# Patient Record
Sex: FEMALE | Race: BLACK OR AFRICAN AMERICAN | NOT HISPANIC OR LATINO | Employment: UNEMPLOYED | URBAN - METROPOLITAN AREA
[De-identification: names, ages, dates, MRNs, and addresses within clinical notes are randomized per-mention and may not be internally consistent; named-entity substitution may affect disease eponyms.]

---

## 2018-07-30 ENCOUNTER — HOSPITAL ENCOUNTER (EMERGENCY)
Facility: HOSPITAL | Age: 38
Discharge: HOME | End: 2018-07-30
Attending: EMERGENCY MEDICINE | Admitting: EMERGENCY MEDICINE
Payer: COMMERCIAL

## 2018-07-30 VITALS
DIASTOLIC BLOOD PRESSURE: 83 MMHG | BODY MASS INDEX: 25.23 KG/M2 | HEART RATE: 77 BPM | TEMPERATURE: 98.5 F | OXYGEN SATURATION: 100 % | WEIGHT: 157 LBS | RESPIRATION RATE: 18 BRPM | HEIGHT: 66 IN | SYSTOLIC BLOOD PRESSURE: 139 MMHG

## 2018-07-30 DIAGNOSIS — S01.01XA LACERATION OF SCALP, INITIAL ENCOUNTER: Primary | ICD-10-CM

## 2018-07-30 PROCEDURE — 99282 EMERGENCY DEPT VISIT SF MDM: CPT

## 2018-07-30 RX ORDER — IBUPROFEN 600 MG/1
600 TABLET ORAL ONCE
Status: COMPLETED | OUTPATIENT
Start: 2018-07-30 | End: 2018-07-30

## 2018-07-30 RX ADMIN — IBUPROFEN 600 MG: 600 TABLET ORAL at 04:51

## 2018-07-30 NOTE — DISCHARGE INSTRUCTIONS
Laceration   WHAT YOU NEED TO KNOW:   A laceration is an injury to the skin and the soft tissue underneath it  Lacerations happen when you are cut or hit by something  They can happen anywhere on the body  DISCHARGE INSTRUCTIONS:   Return to the emergency department if:   · You have heavy bleeding or bleeding that does not stop after 10 minutes of holding firm, direct pressure over the wound  · Your wound opens up  Contact your healthcare provider if:   · You have a fever or chills  · Your laceration is red, warm, or swollen  · You have red streaks on your skin coming from your wound  · You have white or yellow drainage from the wound that smells bad  · You have pain that gets worse, even after treatment  · You have questions or concerns about your condition or care  Medicines:   · Prescription pain medicine  may be given  Ask how to take this medicine safely  · Antibiotics  help treat or prevent a bacterial infection  · Take your medicine as directed  Contact your healthcare provider if you think your medicine is not helping or if you have side effects  Tell him or her if you are allergic to any medicine  Keep a list of the medicines, vitamins, and herbs you take  Include the amounts, and when and why you take them  Bring the list or the pill bottles to follow-up visits  Carry your medicine list with you in case of an emergency  Care for your wound as directed:   · Do not get your wound wet  until your healthcare provider says it is okay  Do not soak your wound in water  Do not go swimming until your healthcare provider says it is okay  Carefully wash the wound with soap and water  Gently pat the area dry or allow it to air dry  · Change your bandages  when they get wet, dirty, or after washing  Apply new, clean bandages as directed  Do not apply elastic bandages or tape too tight  Do not put powders or lotions over your incision  · Apply antibiotic ointment as directed  Your healthcare provider may give you antibiotic ointment to put over your wound if you have stitches  If you have strips of tape over your incision, let them dry up and fall off on their own  If they do not fall off within 14 days, gently remove them  If you have glue over your wound, do not remove or pick at it  If your glue comes off, do not replace it with glue that you have at home  · Check your wound every day for signs of infection such as swelling, redness, or pus  Self-care:   · Apply ice  on your wound for 15 to 20 minutes every hour or as directed  Use an ice pack, or put crushed ice in a plastic bag  Cover it with a towel  Ice helps prevent tissue damage and decreases swelling and pain  · Use a splint as directed  A splint will decrease movement and stress on your wound  It may help it heal faster  A splint may be used for lacerations over joints or areas of your body that bend  Ask your healthcare provider how to apply and remove a splint  · Decrease scarring of your wound  by applying ointments as directed  Do not apply ointments until your healthcare provider says it is okay  You may need to wait until your wound is healed  Ask which ointment to buy and how often to use it  After your wound is healed, use sunscreen over the area when you are out in the sun  You should do this for at least 6 months to 1 year after your injury  Follow up with your healthcare provider as directed: You may need to follow up in 24 to 48 hours to have your wound checked for infection  You will need to return in 3 to 14 days if you have stitches or staples so they can be removed  Care for your wound as directed to prevent infection and help it heal  Write down your questions so you remember to ask them during your visits  © 2017 [employer] Information is for End User's use only and may not be sold, redistributed or otherwise used for commercial purposes   All illustrations and images included in [facility] are the copyrighted property of A D A M , Inc  or Steffen Ospina  The above information is an  only  It is not intended as medical advice for individual conditions or treatments  Talk to your doctor, nurse or pharmacist before following any medical regimen to see if it is safe and effective for you

## 2018-07-30 NOTE — ED PROVIDER NOTES
History  Chief Complaint   Patient presents with    Head Laceration     Pt  states a headboard fell and it her in the back of her head this morning  denies LOC     61-year-old female presents today complaining of head injury which occurred just prior to arrival   States she was taking her bed apart  and the headboard fell, hitting her on the head  There is no loss of consciousness  She did sustain a laceration  She is not on any blood thinners  Tetanus is up-to-date  History provided by:  Patient  Head Injury w/unknown LOC   Location:  L parietal  Mechanism of injury: direct blow    Pain details:     Quality:  Aching    Severity:  Mild    Timing:  Constant    Progression:  Unchanged  Chronicity:  New  Relieved by:  None tried  Worsened by:  Nothing  Ineffective treatments:  None tried  Associated symptoms: no blurred vision, no disorientation, no double vision, no headaches, no loss of consciousness, no nausea, no neck pain and no vomiting    Risk factors: no alcohol use and no aspirin use        None       History reviewed  No pertinent past medical history  History reviewed  No pertinent surgical history  History reviewed  No pertinent family history  I have reviewed and agree with the history as documented  Social History   Substance Use Topics    Smoking status: Never Smoker    Smokeless tobacco: Never Used    Alcohol use Not on file        Review of Systems   Constitutional: Negative for chills, diaphoresis and fever  Eyes: Negative for blurred vision, double vision and visual disturbance  Gastrointestinal: Negative for nausea and vomiting  Musculoskeletal: Negative for neck pain  Skin: Positive for wound  Negative for pallor and rash  Allergic/Immunologic: Negative for immunocompromised state  Neurological: Negative for loss of consciousness and headaches  Psychiatric/Behavioral: Negative for confusion         Physical Exam  Physical Exam   Constitutional: She is oriented to person, place, and time  She appears well-developed and well-nourished  HENT:   Head:       Right Ear: External ear normal    Left Ear: External ear normal    Mouth/Throat: Oropharynx is clear and moist    Small 1 cm laceration on the left parietal scalp  Bleeding is controlled   Eyes: EOM are normal  Pupils are equal, round, and reactive to light  Neck: Normal range of motion  Neck supple  Neurological: She is alert and oriented to person, place, and time  Patient moving all extremities equally, no focal neuro deficits noted  Skin: Skin is warm and dry  Capillary refill takes less than 2 seconds  Psychiatric: She has a normal mood and affect  Nursing note and vitals reviewed  Vital Signs  ED Triage Vitals [07/30/18 0329]   Temperature Pulse Respirations Blood Pressure SpO2   98 5 °F (36 9 °C) 77 18 139/83 100 %      Temp Source Heart Rate Source Patient Position - Orthostatic VS BP Location FiO2 (%)   Oral Monitor Sitting Right arm --      Pain Score       Worst Possible Pain           Vitals:    07/30/18 0329   BP: 139/83   Pulse: 77   Patient Position - Orthostatic VS: Sitting       Visual Acuity  Visual Acuity      Most Recent Value   L Pupil Size (mm)  3   R Pupil Size (mm)  3          ED Medications  Medications   ibuprofen (MOTRIN) tablet 600 mg (600 mg Oral Given 7/30/18 0451)       Diagnostic Studies  Results Reviewed     None                 No orders to display              Procedures  Lac Repair  Date/Time: 7/30/2018 4:20 AM  Performed by: Kelly Mejia  Authorized by: Kelly Mejia   Consent: Verbal consent obtained    Risks and benefits: risks, benefits and alternatives were discussed  Patient understanding: patient states understanding of the procedure being performed  Patient identity confirmed: verbally with patient  Body area: head/neck  Location details: scalp  Laceration length: 1 cm  Foreign bodies: no foreign bodies  Tendon involvement: none  Nerve involvement: none  Vascular damage: no    Sedation:  Patient sedated: no    Wound Dehiscence:  Superficial Wound Dehiscence: simple closure      Procedure Details:  Irrigation solution: saline  Irrigation method: syringe  Amount of cleaning: standard  Skin closure: staples  Number of sutures: 1  Approximation: close  Approximation difficulty: simple  Patient tolerance: Patient tolerated the procedure well with no immediate complications             Phone Contacts  ED Phone Contact    ED Course                               MDM  Number of Diagnoses or Management Options  Laceration of scalp, initial encounter: minor  Risk of Complications, Morbidity, and/or Mortality  Presenting problems: low  Diagnostic procedures: low  Management options: low    Patient Progress  Patient progress: stable    CritCare Time    Disposition  Final diagnoses:   Laceration of scalp, initial encounter     Time reflects when diagnosis was documented in both MDM as applicable and the Disposition within this note     Time User Action Codes Description Comment    7/30/2018  4:19 AM Romel Odom Add [S01 01XA] Laceration of scalp, initial encounter       ED Disposition     ED Disposition Condition Comment    Discharge  Bhavani Boutte discharge to home/self care  Condition at discharge: Stable        Follow-up Information     Follow up With Specialties Details Why Contact Info Additional Information    [address] [facility] Emergency Department Emergency Medicine In 1 week For suture removal [address] [place] [address] ED, [address] [place], [place], [facility]          There are no discharge medications for this patient  No discharge procedures on file      ED Provider  Electronically Signed by           Mercedes Tovar DO  07/30/18 5563

## 2018-08-10 ENCOUNTER — HOSPITAL ENCOUNTER (EMERGENCY)
Facility: HOSPITAL | Age: 38
Discharge: HOME | End: 2018-08-10
Attending: EMERGENCY MEDICINE | Admitting: EMERGENCY MEDICINE
Payer: COMMERCIAL

## 2018-08-10 VITALS
OXYGEN SATURATION: 100 % | WEIGHT: 160 LBS | HEART RATE: 75 BPM | HEIGHT: 65 IN | BODY MASS INDEX: 26.66 KG/M2 | SYSTOLIC BLOOD PRESSURE: 127 MMHG | RESPIRATION RATE: 17 BRPM | TEMPERATURE: 98.5 F | DIASTOLIC BLOOD PRESSURE: 72 MMHG

## 2018-08-10 DIAGNOSIS — Z48.02 ENCOUNTER FOR STAPLE REMOVAL: Primary | ICD-10-CM

## 2018-08-10 PROCEDURE — 99281 EMR DPT VST MAYX REQ PHY/QHP: CPT

## 2018-08-11 NOTE — ED PROVIDER NOTES
History  Chief Complaint   Patient presents with    Suture / Staple Removal     Pt  reports needing one staple removed from left lateral aspect of head  56-year-old female presents for staple removal from left scalp  She had a headboard fall on her 10 days ago, had a staple placed here  She has had no symptoms of head injury  No bleeding from the site  She presents with a wound that is clean, dry, intact - ready to have staple removed  None       History reviewed  No pertinent past medical history  History reviewed  No pertinent surgical history  History reviewed  No pertinent family history  I have reviewed and agree with the history as documented  Social History   Substance Use Topics    Smoking status: Never Smoker    Smokeless tobacco: Never Used    Alcohol use Not on file        Review of Systems   Constitutional: Negative for chills and fever  Skin: Positive for wound (scalp - healed)  Negative for rash  Neurological: Negative for headaches  Physical Exam  Physical Exam   Constitutional: She is oriented to person, place, and time  She appears well-developed and well-nourished  No distress  HENT:   Head: Normocephalic  Mouth/Throat: Oropharynx is clear and moist    2 centimeter scab to left parietal scalp with a single staple in it  Stable is removed without incident  Mild hematoma under the skin  Mild tenderness to palpation  Eyes: Conjunctivae and EOM are normal    Neck: Normal range of motion  Pulmonary/Chest: Effort normal  No respiratory distress  Musculoskeletal: Normal range of motion  Neurological: She is alert and oriented to person, place, and time  No cranial nerve deficit  Skin: Skin is warm and dry  She is not diaphoretic  No pallor  Psychiatric: She has a normal mood and affect  Her behavior is normal    Vitals reviewed        Vital Signs  ED Triage Vitals [08/10/18 2135]   Temperature Pulse Respirations Blood Pressure SpO2   98 5 °F (36 9 °C) 75 17 127/72 100 %      Temp Source Heart Rate Source Patient Position - Orthostatic VS BP Location FiO2 (%)   Oral Monitor Sitting Right arm --      Pain Score       --           Vitals:    08/10/18 2135   BP: 127/72   Pulse: 75   Patient Position - Orthostatic VS: Sitting       Visual Acuity      ED Medications  Medications - No data to display    Diagnostic Studies  Results Reviewed     None                 No orders to display              Procedures  Suture Removal  Date/Time: 8/10/2018 9:55 PM  Performed by: Alejandra Campos by: Jonny Jeans     Patient location:  ED  Other Assisting Provider: No    Consent:     Consent obtained:  Verbal    Consent given by:  Patient    Risks discussed:  Bleeding, pain and wound separation  Universal protocol:     Patient identity confirmed:  Verbally with patient  Location:     Laterality:  Left    Location:  Head/neck    Head/neck location:  Scalp  Procedure details:     Nail bed suture material: staple removal     Wound appearance:  No sign(s) of infection, good wound healing, clean and tender    Number of staples removed:  1  Post-procedure details:     Post-removal:  No dressing applied    Patient tolerance of procedure: Tolerated well, no immediate complications           Phone Contacts  ED Phone Contact    ED Course                               MDM  Number of Diagnoses or Management Options  Encounter for staple removal:   Diagnosis management comments: Encounter for staple removal to the L parietal region  Stable is removed without incident  Patient will be discharged home with good return precautions in case of reopening or evidence of sx of  head injury      CritCare Time    Disposition  Final diagnoses:   Encounter for staple removal     Time reflects when diagnosis was documented in both MDM as applicable and the Disposition within this note     Time User Action Codes Description Comment    8/10/2018  9:54 PM Trung Juni Murillo Add [Z48 02] Encounter for staple removal       ED Disposition     ED Disposition Condition Comment    Discharge  Kamaljit Chavez discharge to home/self care  Condition at discharge: Good        Follow-up Information    None         There are no discharge medications for this patient  No discharge procedures on file      ED Provider  Electronically Signed by           Jose Alberto Uriostegui DO  08/10/18 0071

## 2018-08-11 NOTE — ED NOTES
Pt  Seen, evaluated, and discharged by provider independent of nursing care       Enrique Green RN  08/10/18 5943

## 2019-01-06 ENCOUNTER — EMERGENCY (EMERGENCY)
Facility: HOSPITAL | Age: 39
LOS: 0 days | Discharge: HOME | End: 2019-01-06
Attending: EMERGENCY MEDICINE
Payer: MEDICAID

## 2019-01-06 VITALS
WEIGHT: 149.03 LBS | HEART RATE: 132 BPM | HEIGHT: 65 IN | RESPIRATION RATE: 17 BRPM | OXYGEN SATURATION: 97 % | SYSTOLIC BLOOD PRESSURE: 111 MMHG | TEMPERATURE: 103 F | DIASTOLIC BLOOD PRESSURE: 64 MMHG

## 2019-01-06 VITALS
OXYGEN SATURATION: 99 % | DIASTOLIC BLOOD PRESSURE: 74 MMHG | SYSTOLIC BLOOD PRESSURE: 112 MMHG | HEART RATE: 97 BPM | RESPIRATION RATE: 16 BRPM | TEMPERATURE: 99 F

## 2019-01-06 DIAGNOSIS — R51 HEADACHE: ICD-10-CM

## 2019-01-06 DIAGNOSIS — H92.03 OTALGIA, BILATERAL: ICD-10-CM

## 2019-01-06 DIAGNOSIS — N39.0 URINARY TRACT INFECTION, SITE NOT SPECIFIED: ICD-10-CM

## 2019-01-06 DIAGNOSIS — R50.9 FEVER, UNSPECIFIED: ICD-10-CM

## 2019-01-06 LAB
ALBUMIN SERPL ELPH-MCNC: 3.4 G/DL — SIGNIFICANT CHANGE UP (ref 3.3–5)
ALP SERPL-CCNC: 71 U/L — SIGNIFICANT CHANGE UP (ref 40–120)
ALT FLD-CCNC: 20 U/L — SIGNIFICANT CHANGE UP (ref 12–78)
ANION GAP SERPL CALC-SCNC: 10 MMOL/L — SIGNIFICANT CHANGE UP (ref 5–17)
APPEARANCE UR: CLEAR — SIGNIFICANT CHANGE UP
AST SERPL-CCNC: 16 U/L — SIGNIFICANT CHANGE UP (ref 15–37)
BACTERIA # UR AUTO: ABNORMAL
BASOPHILS # BLD AUTO: 0.05 K/UL — SIGNIFICANT CHANGE UP (ref 0–0.2)
BASOPHILS NFR BLD AUTO: 0.4 % — SIGNIFICANT CHANGE UP (ref 0–2)
BILIRUB SERPL-MCNC: 0.7 MG/DL — SIGNIFICANT CHANGE UP (ref 0.2–1.2)
BILIRUB UR-MCNC: NEGATIVE — SIGNIFICANT CHANGE UP
BUN SERPL-MCNC: 8 MG/DL — SIGNIFICANT CHANGE UP (ref 7–23)
CALCIUM SERPL-MCNC: 8.3 MG/DL — LOW (ref 8.5–10.1)
CHLORIDE SERPL-SCNC: 97 MMOL/L — SIGNIFICANT CHANGE UP (ref 96–108)
CO2 SERPL-SCNC: 26 MMOL/L — SIGNIFICANT CHANGE UP (ref 22–31)
COLOR SPEC: YELLOW — SIGNIFICANT CHANGE UP
CREAT SERPL-MCNC: 1 MG/DL — SIGNIFICANT CHANGE UP (ref 0.5–1.3)
DIFF PNL FLD: ABNORMAL
EOSINOPHIL # BLD AUTO: 0 K/UL — SIGNIFICANT CHANGE UP (ref 0–0.5)
EOSINOPHIL NFR BLD AUTO: 0 % — SIGNIFICANT CHANGE UP (ref 0–6)
EPI CELLS # UR: SIGNIFICANT CHANGE UP
FLUAV SPEC QL CULT: NEGATIVE — SIGNIFICANT CHANGE UP
FLUBV AG SPEC QL IA: NEGATIVE — SIGNIFICANT CHANGE UP
GLUCOSE SERPL-MCNC: 118 MG/DL — HIGH (ref 70–99)
GLUCOSE UR QL: NEGATIVE MG/DL — SIGNIFICANT CHANGE UP
HCG SERPL-ACNC: <1 MIU/ML — SIGNIFICANT CHANGE UP
HCT VFR BLD CALC: 34.9 % — SIGNIFICANT CHANGE UP (ref 34.5–45)
HGB BLD-MCNC: 12.1 G/DL — SIGNIFICANT CHANGE UP (ref 11.5–15.5)
IMM GRANULOCYTES NFR BLD AUTO: 0.7 % — SIGNIFICANT CHANGE UP (ref 0–1.5)
KETONES UR-MCNC: NEGATIVE — SIGNIFICANT CHANGE UP
LACTATE SERPL-SCNC: 1.2 MMOL/L — SIGNIFICANT CHANGE UP (ref 0.7–2)
LEUKOCYTE ESTERASE UR-ACNC: ABNORMAL
LYMPHOCYTES # BLD AUTO: 0.93 K/UL — LOW (ref 1–3.3)
LYMPHOCYTES # BLD AUTO: 6.8 % — LOW (ref 13–44)
MCHC RBC-ENTMCNC: 29.5 PG — SIGNIFICANT CHANGE UP (ref 27–34)
MCHC RBC-ENTMCNC: 34.7 GM/DL — SIGNIFICANT CHANGE UP (ref 32–36)
MCV RBC AUTO: 85.1 FL — SIGNIFICANT CHANGE UP (ref 80–100)
MONOCYTES # BLD AUTO: 1.17 K/UL — HIGH (ref 0–0.9)
MONOCYTES NFR BLD AUTO: 8.6 % — SIGNIFICANT CHANGE UP (ref 2–14)
NEUTROPHILS # BLD AUTO: 11.34 K/UL — HIGH (ref 1.8–7.4)
NEUTROPHILS NFR BLD AUTO: 83.5 % — HIGH (ref 43–77)
NITRITE UR-MCNC: NEGATIVE — SIGNIFICANT CHANGE UP
NRBC # BLD: 0 /100 WBCS — SIGNIFICANT CHANGE UP (ref 0–0)
PH UR: 6 — SIGNIFICANT CHANGE UP (ref 5–8)
PLATELET # BLD AUTO: 190 K/UL — SIGNIFICANT CHANGE UP (ref 150–400)
POTASSIUM SERPL-MCNC: 3.4 MMOL/L — LOW (ref 3.5–5.3)
POTASSIUM SERPL-SCNC: 3.4 MMOL/L — LOW (ref 3.5–5.3)
PROT SERPL-MCNC: 8.3 GM/DL — SIGNIFICANT CHANGE UP (ref 6–8.3)
PROT UR-MCNC: 100 MG/DL
RBC # BLD: 4.1 M/UL — SIGNIFICANT CHANGE UP (ref 3.8–5.2)
RBC # FLD: 12.4 % — SIGNIFICANT CHANGE UP (ref 10.3–14.5)
RBC CASTS # UR COMP ASSIST: ABNORMAL /HPF (ref 0–4)
SODIUM SERPL-SCNC: 133 MMOL/L — LOW (ref 135–145)
SP GR SPEC: 1 — LOW (ref 1.01–1.02)
UROBILINOGEN FLD QL: NEGATIVE MG/DL — SIGNIFICANT CHANGE UP
WBC # BLD: 13.59 K/UL — HIGH (ref 3.8–10.5)
WBC # FLD AUTO: 13.59 K/UL — HIGH (ref 3.8–10.5)
WBC UR QL: >50

## 2019-01-06 PROCEDURE — 99284 EMERGENCY DEPT VISIT MOD MDM: CPT | Mod: 25

## 2019-01-06 PROCEDURE — 71045 X-RAY EXAM CHEST 1 VIEW: CPT | Mod: 26

## 2019-01-06 PROCEDURE — 99053 MED SERV 10PM-8AM 24 HR FAC: CPT

## 2019-01-06 PROCEDURE — 70450 CT HEAD/BRAIN W/O DYE: CPT | Mod: 26

## 2019-01-06 RX ORDER — ACETAMINOPHEN 500 MG
975 TABLET ORAL ONCE
Qty: 0 | Refills: 0 | Status: COMPLETED | OUTPATIENT
Start: 2019-01-06 | End: 2019-01-06

## 2019-01-06 RX ORDER — CEPHALEXIN 500 MG
1 CAPSULE ORAL
Qty: 28 | Refills: 0 | OUTPATIENT
Start: 2019-01-06 | End: 2019-01-12

## 2019-01-06 RX ORDER — SODIUM CHLORIDE 9 MG/ML
2000 INJECTION INTRAMUSCULAR; INTRAVENOUS; SUBCUTANEOUS ONCE
Qty: 0 | Refills: 0 | Status: COMPLETED | OUTPATIENT
Start: 2019-01-06 | End: 2019-01-06

## 2019-01-06 RX ADMIN — SODIUM CHLORIDE 2000 MILLILITER(S): 9 INJECTION INTRAMUSCULAR; INTRAVENOUS; SUBCUTANEOUS at 05:15

## 2019-01-06 RX ADMIN — Medication 1 TABLET(S): at 05:12

## 2019-01-06 RX ADMIN — SODIUM CHLORIDE 1000 MILLILITER(S): 9 INJECTION INTRAMUSCULAR; INTRAVENOUS; SUBCUTANEOUS at 03:13

## 2019-01-06 RX ADMIN — Medication 975 MILLIGRAM(S): at 05:15

## 2019-01-06 RX ADMIN — Medication 975 MILLIGRAM(S): at 03:13

## 2019-01-06 NOTE — ED PROVIDER NOTE - OBJECTIVE STATEMENT
Pertinent PMH/PSH/FHx/SHx and Review of Systems contained within:  Patient presents to the ED for headache and ear pain.  Patient comes in for chills and BL ear pain since yesterday.  Denies vision changes, neck stiffness, midline back pain, sore throat, cough, chest pain, abdominal pain, nausea, vomiting, or urinary symptoms.  No recent travel, no known sick contacts.     Relevant PMHx/SHx/SOCHx/FAMH:  Denies relevant pmh or psh  Patient denies EtOH/tobacco/illicit substance use.    ROS: No photophobia/eye pain/changes in vision, No ear pain/sore throat/dysphagia, No chest pain/palpitations, no SOB/cough/wheeze/stridor, No abdominal pain, No N/V/D/melena, no dysuria/frequency/discharge, No neck/back pain, no rash, no changes in neurological status/function.

## 2019-01-06 NOTE — ED PROVIDER NOTE - CARE PLAN
Principal Discharge DX:	UTI (urinary tract infection)  Secondary Diagnosis:	Fever Principal Discharge DX:	UTI (urinary tract infection)  Secondary Diagnosis:	Fever  Secondary Diagnosis:	Ear pain, bilateral

## 2019-01-06 NOTE — ED PROVIDER NOTE - PHYSICAL EXAMINATION
Gen: Alert, NAD, slightly ill appearing  Head: NC, AT, PERRL, EOMI, normal lids/conjunctiva  ENT: normal hearing, patent oropharynx without erythema/exudate, uvula midline, TMs BL wnl  Neck: +supple, no tenderness/meningismus/JVD, +Trachea midline  Pulm: Bilateral BS, normal resp effort, no wheeze/stridor/retractions  CV: tachycardia, no M/R/G, +dist pulses  Abd: soft, NT/ND, +BS, no palpable masses  Mskel: no edema/erythema/cyanosis  Skin: no rash, warm/dry  Neuro: AAOx3, no apparent sensory/motor deficits, coordination intact Gen: Alert, NAD, slightly ill appearing  Head: NC, AT, PERRL, EOMI, normal lids/conjunctiva, no mastoid tenderness or swelling  ENT: normal hearing, patent oropharynx without erythema/exudate, uvula midline, TMs BL wnl  Neck: +supple, no tenderness/meningismus/JVD, +Trachea midline  Pulm: Bilateral BS, normal resp effort, no wheeze/stridor/retractions  CV: tachycardia, no M/R/G, +dist pulses  Abd: soft, NT/ND, +BS, no palpable masses  Mskel: no edema/erythema/cyanosis  Skin: no rash, warm/dry  Neuro: AAOx3, no apparent sensory/motor deficits, coordination intact

## 2019-01-06 NOTE — ED PROVIDER NOTE - MEDICAL DECISION MAKING DETAILS
Patient presents for fever and BL ear pain.  Febrile on arrival to ER.  Labs, UA, CXR obtained.  Source of patient's fever appears to be UTI, does not appear to have otitis media, no sinusitis on CT, initially given augmentin for presumed sinsitis, otitis media.  Symptoms improved in ER, patient feels better.  Will dc with keflex, patient advised to continue rest and supportive care.  Discussed results and outcome of today's visit with the patient.  Patient advised to please follow up with another healthcare provider within the next 24 hours and return to the Emergency Department for worsening symptoms or any other concerns.  Patient advised that their doctor may call  to follow up on the specific results of the tests performed today in the emergency department.   Patient appears well on discharge. Patient presents for fever and BL ear pain.  Febrile on arrival to ER.  Labs, UA, CXR obtained.  Source of patient's fever appears to be UTI, does not appear to have otitis media, no sinusitis on CT, initially given augmentin for presumed sinsitis, otitis media.  Symptoms improved in ER, patient feels better.  Will dc with keflex, patient advised to continue rest and supportive care.  ENT follow up given for persistent ear pain.  Discussed results and outcome of today's visit with the patient.  Patient advised to please follow up with another healthcare provider within the next 24 hours and return to the Emergency Department for worsening symptoms or any other concerns.  Patient advised that their doctor may call  to follow up on the specific results of the tests performed today in the emergency department.   Patient appears well on discharge.

## 2019-01-08 LAB
-  AMIKACIN: SIGNIFICANT CHANGE UP
-  AMIKACIN: SIGNIFICANT CHANGE UP
-  AMOXICILLIN/CLAVULANIC ACID: SIGNIFICANT CHANGE UP
-  AMOXICILLIN/CLAVULANIC ACID: SIGNIFICANT CHANGE UP
-  AMPICILLIN/SULBACTAM: SIGNIFICANT CHANGE UP
-  AMPICILLIN/SULBACTAM: SIGNIFICANT CHANGE UP
-  AMPICILLIN: SIGNIFICANT CHANGE UP
-  AMPICILLIN: SIGNIFICANT CHANGE UP
-  AZTREONAM: SIGNIFICANT CHANGE UP
-  AZTREONAM: SIGNIFICANT CHANGE UP
-  CEFAZOLIN: SIGNIFICANT CHANGE UP
-  CEFAZOLIN: SIGNIFICANT CHANGE UP
-  CEFEPIME: SIGNIFICANT CHANGE UP
-  CEFEPIME: SIGNIFICANT CHANGE UP
-  CEFOXITIN: SIGNIFICANT CHANGE UP
-  CEFOXITIN: SIGNIFICANT CHANGE UP
-  CEFTRIAXONE: SIGNIFICANT CHANGE UP
-  CEFTRIAXONE: SIGNIFICANT CHANGE UP
-  CIPROFLOXACIN: SIGNIFICANT CHANGE UP
-  CIPROFLOXACIN: SIGNIFICANT CHANGE UP
-  ERTAPENEM: SIGNIFICANT CHANGE UP
-  ERTAPENEM: SIGNIFICANT CHANGE UP
-  GENTAMICIN: SIGNIFICANT CHANGE UP
-  GENTAMICIN: SIGNIFICANT CHANGE UP
-  IMIPENEM: SIGNIFICANT CHANGE UP
-  IMIPENEM: SIGNIFICANT CHANGE UP
-  LEVOFLOXACIN: SIGNIFICANT CHANGE UP
-  LEVOFLOXACIN: SIGNIFICANT CHANGE UP
-  MEROPENEM: SIGNIFICANT CHANGE UP
-  MEROPENEM: SIGNIFICANT CHANGE UP
-  NITROFURANTOIN: SIGNIFICANT CHANGE UP
-  NITROFURANTOIN: SIGNIFICANT CHANGE UP
-  PIPERACILLIN/TAZOBACTAM: SIGNIFICANT CHANGE UP
-  PIPERACILLIN/TAZOBACTAM: SIGNIFICANT CHANGE UP
-  TIGECYCLINE: SIGNIFICANT CHANGE UP
-  TIGECYCLINE: SIGNIFICANT CHANGE UP
-  TOBRAMYCIN: SIGNIFICANT CHANGE UP
-  TOBRAMYCIN: SIGNIFICANT CHANGE UP
-  TRIMETHOPRIM/SULFAMETHOXAZOLE: SIGNIFICANT CHANGE UP
-  TRIMETHOPRIM/SULFAMETHOXAZOLE: SIGNIFICANT CHANGE UP
CULTURE RESULTS: SIGNIFICANT CHANGE UP
METHOD TYPE: SIGNIFICANT CHANGE UP
METHOD TYPE: SIGNIFICANT CHANGE UP
ORGANISM # SPEC MICROSCOPIC CNT: SIGNIFICANT CHANGE UP
SPECIMEN SOURCE: SIGNIFICANT CHANGE UP

## 2019-01-11 LAB
CULTURE RESULTS: SIGNIFICANT CHANGE UP
CULTURE RESULTS: SIGNIFICANT CHANGE UP
SPECIMEN SOURCE: SIGNIFICANT CHANGE UP
SPECIMEN SOURCE: SIGNIFICANT CHANGE UP

## 2021-05-06 ENCOUNTER — EMERGENCY (EMERGENCY)
Facility: HOSPITAL | Age: 41
LOS: 1 days | Discharge: HOME | End: 2021-05-06
Attending: HOSPITALIST | Admitting: HOSPITALIST
Payer: MEDICAID

## 2021-05-06 VITALS
TEMPERATURE: 98 F | OXYGEN SATURATION: 99 % | RESPIRATION RATE: 18 BRPM | HEART RATE: 118 BPM | SYSTOLIC BLOOD PRESSURE: 117 MMHG | DIASTOLIC BLOOD PRESSURE: 78 MMHG

## 2021-05-06 VITALS
RESPIRATION RATE: 16 BRPM | SYSTOLIC BLOOD PRESSURE: 135 MMHG | HEART RATE: 104 BPM | DIASTOLIC BLOOD PRESSURE: 90 MMHG | OXYGEN SATURATION: 99 % | TEMPERATURE: 99 F

## 2021-05-06 LAB
ANION GAP SERPL CALC-SCNC: 12 MMOL/L — SIGNIFICANT CHANGE UP (ref 7–14)
APPEARANCE UR: ABNORMAL
BACTERIA # UR AUTO: ABNORMAL
BILIRUB UR-MCNC: NEGATIVE — SIGNIFICANT CHANGE UP
BUN SERPL-MCNC: 10 MG/DL — SIGNIFICANT CHANGE UP (ref 7–23)
CALCIUM SERPL-MCNC: 9.1 MG/DL — SIGNIFICANT CHANGE UP (ref 8.4–10.5)
CHLORIDE SERPL-SCNC: 98 MMOL/L — SIGNIFICANT CHANGE UP (ref 98–107)
CO2 SERPL-SCNC: 23 MMOL/L — SIGNIFICANT CHANGE UP (ref 22–31)
COLOR SPEC: YELLOW — SIGNIFICANT CHANGE UP
CREAT SERPL-MCNC: 0.8 MG/DL — SIGNIFICANT CHANGE UP (ref 0.5–1.3)
DIFF PNL FLD: ABNORMAL
EPI CELLS # UR: 6 /HPF — HIGH (ref 0–5)
GLUCOSE SERPL-MCNC: 106 MG/DL — HIGH (ref 70–99)
GLUCOSE UR QL: NEGATIVE — SIGNIFICANT CHANGE UP
HCG SERPL-ACNC: <5 MIU/ML — SIGNIFICANT CHANGE UP
HCT VFR BLD CALC: 35.6 % — SIGNIFICANT CHANGE UP (ref 34.5–45)
HGB BLD-MCNC: 11.9 G/DL — SIGNIFICANT CHANGE UP (ref 11.5–15.5)
HYALINE CASTS # UR AUTO: 0 /LPF — SIGNIFICANT CHANGE UP (ref 0–7)
KETONES UR-MCNC: ABNORMAL
LEUKOCYTE ESTERASE UR-ACNC: ABNORMAL
MCHC RBC-ENTMCNC: 29.1 PG — SIGNIFICANT CHANGE UP (ref 27–34)
MCHC RBC-ENTMCNC: 33.4 GM/DL — SIGNIFICANT CHANGE UP (ref 32–36)
MCV RBC AUTO: 87 FL — SIGNIFICANT CHANGE UP (ref 80–100)
NITRITE UR-MCNC: NEGATIVE — SIGNIFICANT CHANGE UP
NRBC # BLD: 0 /100 WBCS — SIGNIFICANT CHANGE UP
NRBC # FLD: 0 K/UL — SIGNIFICANT CHANGE UP
PH UR: 6 — SIGNIFICANT CHANGE UP (ref 5–8)
PLATELET # BLD AUTO: 141 K/UL — LOW (ref 150–400)
POTASSIUM SERPL-MCNC: 3.7 MMOL/L — SIGNIFICANT CHANGE UP (ref 3.5–5.3)
POTASSIUM SERPL-SCNC: 3.7 MMOL/L — SIGNIFICANT CHANGE UP (ref 3.5–5.3)
PROT UR-MCNC: ABNORMAL
RBC # BLD: 4.09 M/UL — SIGNIFICANT CHANGE UP (ref 3.8–5.2)
RBC # FLD: 12.9 % — SIGNIFICANT CHANGE UP (ref 10.3–14.5)
RBC CASTS # UR COMP ASSIST: 6 /HPF — HIGH (ref 0–4)
SODIUM SERPL-SCNC: 133 MMOL/L — LOW (ref 135–145)
SP GR SPEC: 1.02 — SIGNIFICANT CHANGE UP (ref 1.01–1.02)
UROBILINOGEN FLD QL: ABNORMAL
WBC # BLD: 8.2 K/UL — SIGNIFICANT CHANGE UP (ref 3.8–10.5)
WBC # FLD AUTO: 8.2 K/UL — SIGNIFICANT CHANGE UP (ref 3.8–10.5)
WBC UR QL: 41 /HPF — HIGH (ref 0–5)

## 2021-05-06 PROCEDURE — 99284 EMERGENCY DEPT VISIT MOD MDM: CPT

## 2021-05-06 RX ORDER — KETOROLAC TROMETHAMINE 30 MG/ML
15 SYRINGE (ML) INJECTION ONCE
Refills: 0 | Status: DISCONTINUED | OUTPATIENT
Start: 2021-05-06 | End: 2021-05-06

## 2021-05-06 RX ORDER — ACETAMINOPHEN 500 MG
650 TABLET ORAL ONCE
Refills: 0 | Status: COMPLETED | OUTPATIENT
Start: 2021-05-06 | End: 2021-05-06

## 2021-05-06 RX ORDER — SODIUM CHLORIDE 9 MG/ML
1000 INJECTION INTRAMUSCULAR; INTRAVENOUS; SUBCUTANEOUS ONCE
Refills: 0 | Status: COMPLETED | OUTPATIENT
Start: 2021-05-06 | End: 2021-05-06

## 2021-05-06 RX ORDER — METOCLOPRAMIDE HCL 10 MG
10 TABLET ORAL ONCE
Refills: 0 | Status: COMPLETED | OUTPATIENT
Start: 2021-05-06 | End: 2021-05-06

## 2021-05-06 RX ADMIN — Medication 650 MILLIGRAM(S): at 03:22

## 2021-05-06 RX ADMIN — SODIUM CHLORIDE 1000 MILLILITER(S): 9 INJECTION INTRAMUSCULAR; INTRAVENOUS; SUBCUTANEOUS at 03:22

## 2021-05-06 RX ADMIN — Medication 10 MILLIGRAM(S): at 03:22

## 2021-05-06 RX ADMIN — Medication 15 MILLIGRAM(S): at 03:22

## 2021-05-06 NOTE — ED PROVIDER NOTE - OBJECTIVE STATEMENT
Healthy 40 yoF, presenting with headache for approx 96 hours. Migratory. Persistent. Not thunderclap. Taking ibuprofen which helps minimally. Denies any fever, neck pain, neuro sx, trauma, visual changes. Similar presentation in 2018. No AC meds or hx of VTE. Noticed cramping as possible trigger prompting her to see GYN. Denies smoking, drinking, drug use. No NV but decreased PO due to not appetite. On macrobid for cystitis.

## 2021-05-06 NOTE — ED PROVIDER NOTE - NSFOLLOWUPINSTRUCTIONS_ED_ALL_ED_FT
You have a headache.    This seems similar to your last event in 2018.    Stay hydrated.    Take ibuprofen and tylenol for pain.    Consider following up with neurology as you may have primary headache disorder.    Please return to ER for new or concerning symptoms.

## 2021-05-06 NOTE — ED ADULT TRIAGE NOTE - CHIEF COMPLAINT QUOTE
pt c/o headache since Monday and body aches which have since subsided. went to urgent care had negative rapid COVID test (PCR pending), was told to hydrate and go to ER if headache continues or worsened. denies PMH.

## 2021-05-06 NOTE — ED PROVIDER NOTE - CLINICAL SUMMARY MEDICAL DECISION MAKING FREE TEXT BOX
Elias, PGY3- 4 days of constant headache, migratory. No red flags. Similar episode in 2018. Decreased PO but no NV. No fever or neck pain. No neuro sx.  Normal exam, neck supple, normal neuro exam  supportive care, hydration, no indication for imaging. Not consistent with glaucoma, SAH, meningitis, cerebral thrombosis

## 2021-05-06 NOTE — ED PROVIDER NOTE - PROGRESS NOTE DETAILS
Haverty, PGY3- sx much improved. UA noted but on abx. Will send cx and have pt continue abx. Stable for d/c.

## 2021-05-06 NOTE — ED PROVIDER NOTE - PHYSICAL EXAMINATION
General: alert, conversant, looks well, vitals reassuring, mild distress   Head: atraumatic, normocephalic  Eyes: PERRL, EOMI, no scleral icterus  ENT: no epistaxis, normal phonation, airway patent  Neck: full ROM, no midline ttp, no meningeal signs   CV: RRR, no murmurs, HDS  Pulm: lungs CTA b/l, no wheezing, no respiratory distress  GI: abd soft, non tender, no guarding/rebound/masses  Back: normal ROM  Extremities: normal ROM, joints stable, distal pulses intact, no edema  Neuro: alert, oriented x3, moving all extremities, interactive, normal speech/memory, PERRL  Derm: warm, dry, normal color, no rash/wounds

## 2021-05-06 NOTE — ED PROVIDER NOTE - NSFOLLOWUPCLINICS_GEN_ALL_ED_FT
[facility]  Neurology  [address] [place] [address] [address] [place] [address]  Phone: [phone]  Fax:

## 2021-05-06 NOTE — ED PROVIDER NOTE - PATIENT PORTAL LINK FT
You can access the FollowMyHealth Patient Portal offered by [facility] by registering at the following website: [email or web address]. By joining [employer]’s FollowMyHealth portal, you will also be able to view your health information using other applications (apps) compatible with our system.

## 2021-05-06 NOTE — ED ADULT NURSE NOTE - OBJECTIVE STATEMENT
Pt received to room 7 A&OX4 and ambulatory. PT C/O new onset of HA symptoms. States she was seen yesterday for symptoms at urgent care and found no relief with ibuprofen. No neuro deficits noted. Denies weakness, dizines Pt received to room 7 A&OX4 and ambulatory. PT C/O new onset of HA symptoms. States she was seen yesterday for symptoms at urgent care and found no relief with ibuprofen. No neuro deficits noted. Denies weakness, dizziness, vision changes at this time. Pt endorsing "ringing in ears" and sharp head ache sensation. MD courtney in progress. No neuro, sensory or motor deficits. 20G IV placed to RAC and labs drawn as ordered.

## 2021-05-06 NOTE — ED PROVIDER NOTE - ATTENDING CONTRIBUTION TO CARE
c 40F with c/o headache for the past 4 days. patient states avery moves to different locations in her head, taking ibuprofen w/o much relief. no visual changes, neck pain, n/v, photophobia. had a similar HA a few years ago.    ***GEN - NAD; well appearing; A+O x3 ***HEAD - NC/AT ***EYES/NOSE - PERRL, EOMI, mucous membranes moist, no discharge ***THROAT: Oral cavity and pharynx normal. No inflammation, swelling, exudate, or lesions.  ***NECK: Neck supple, non-tender without lymphadenopathy, no masses, no thyromegaly.   ***PULMONARY - CTA b/l, symmetric breath sounds. ***CARDIAC -s1s2, RRR, no M,G,R  ***ABDOMEN - +BS, ND, NT, soft, no guarding, no rebound, no masses   ***BACK - no CVA tenderness, Normal  spine ***EXTREMITIES - symmetric pulses, 2+ dp, capillary refill < 2 seconds, no clubbing, no cyanosis, no edema ***SKIN - no rash or bruising   ***NEUROLOGIC - alert, CN 2-12 intact    MDM: 40F with HA. normal neuro exam. will treat HA and reassess.

## 2021-05-07 LAB
CULTURE RESULTS: NO GROWTH — SIGNIFICANT CHANGE UP
SPECIMEN SOURCE: SIGNIFICANT CHANGE UP

## 2022-10-24 NOTE — ED PROVIDER NOTE - NS ED MD DISPO DISCHARGE CCDA
Home care services have been arranged for the patient.  Home Care Agency: [facility]  Home Care Phone Number: [phone]   Services: Physical Therapy and Occupational Therapy  Instructions: Home care agency will call within 48 hours of discharge to schedule initial assessment.    Patient/Caregiver provided printed discharge information.

## 2024-06-01 ENCOUNTER — EMERGENCY (EMERGENCY)
Facility: HOSPITAL | Age: 44
LOS: 1 days | Discharge: HOME | End: 2024-06-01
Attending: STUDENT IN AN ORGANIZED HEALTH CARE EDUCATION/TRAINING PROGRAM | Admitting: EMERGENCY MEDICINE
Payer: COMMERCIAL

## 2024-06-01 VITALS
SYSTOLIC BLOOD PRESSURE: 104 MMHG | TEMPERATURE: 98 F | RESPIRATION RATE: 16 BRPM | WEIGHT: 169.09 LBS | DIASTOLIC BLOOD PRESSURE: 81 MMHG | OXYGEN SATURATION: 100 % | HEART RATE: 72 BPM

## 2024-06-01 LAB
APPEARANCE UR: CLEAR — SIGNIFICANT CHANGE UP
BACTERIA # UR AUTO: ABNORMAL /HPF
BILIRUB UR-MCNC: NEGATIVE — SIGNIFICANT CHANGE UP
CAST: 1 /LPF — SIGNIFICANT CHANGE UP (ref 0–4)
COLOR SPEC: YELLOW — SIGNIFICANT CHANGE UP
DIFF PNL FLD: NEGATIVE — SIGNIFICANT CHANGE UP
GLUCOSE UR QL: NEGATIVE MG/DL — SIGNIFICANT CHANGE UP
KETONES UR-MCNC: NEGATIVE MG/DL — SIGNIFICANT CHANGE UP
LEUKOCYTE ESTERASE UR-ACNC: NEGATIVE — SIGNIFICANT CHANGE UP
NITRITE UR-MCNC: NEGATIVE — SIGNIFICANT CHANGE UP
PH UR: 6 — SIGNIFICANT CHANGE UP (ref 5–8)
PROT UR-MCNC: NEGATIVE MG/DL — SIGNIFICANT CHANGE UP
RBC CASTS # UR COMP ASSIST: 0 /HPF — SIGNIFICANT CHANGE UP (ref 0–4)
SP GR SPEC: 1.01 — SIGNIFICANT CHANGE UP (ref 1–1.03)
SQUAMOUS # UR AUTO: 16 /HPF — HIGH (ref 0–5)
UROBILINOGEN FLD QL: 1 MG/DL — SIGNIFICANT CHANGE UP (ref 0.2–1)
WBC UR QL: 2 /HPF — SIGNIFICANT CHANGE UP (ref 0–5)

## 2024-06-01 PROCEDURE — 99284 EMERGENCY DEPT VISIT MOD MDM: CPT | Mod: 25

## 2024-06-01 RX ORDER — CYCLOBENZAPRINE HYDROCHLORIDE 10 MG/1
5 TABLET, FILM COATED ORAL ONCE
Refills: 0 | Status: COMPLETED | OUTPATIENT
Start: 2024-06-01 | End: 2024-06-01

## 2024-06-01 RX ORDER — ACETAMINOPHEN 500 MG
975 TABLET ORAL ONCE
Refills: 0 | Status: COMPLETED | OUTPATIENT
Start: 2024-06-01 | End: 2024-06-01

## 2024-06-01 RX ORDER — IBUPROFEN 200 MG
600 TABLET ORAL ONCE
Refills: 0 | Status: COMPLETED | OUTPATIENT
Start: 2024-06-01 | End: 2024-06-01

## 2024-06-01 RX ORDER — LIDOCAINE 4 G/100G
1 CREAM TOPICAL ONCE
Refills: 0 | Status: COMPLETED | OUTPATIENT
Start: 2024-06-01 | End: 2024-06-01

## 2024-06-01 RX ADMIN — LIDOCAINE 1 PATCH: 4 CREAM TOPICAL at 06:36

## 2024-06-01 RX ADMIN — Medication 975 MILLIGRAM(S): at 06:36

## 2024-06-01 RX ADMIN — CYCLOBENZAPRINE HYDROCHLORIDE 5 MILLIGRAM(S): 10 TABLET, FILM COATED ORAL at 06:36

## 2024-06-01 RX ADMIN — Medication 600 MILLIGRAM(S): at 06:35

## 2024-06-01 NOTE — ED PROVIDER NOTE - PATIENT PORTAL LINK FT
You can access the FollowMyHealth Patient Portal offered by [facility] by registering at the following website: [email or web address]. By joining [employer]’s FollowMyHealth portal, you will also be able to view your health information using other applications (apps) compatible with our system.

## 2024-06-01 NOTE — ED PROVIDER NOTE - NSFOLLOWUPINSTRUCTIONS_ED_ALL_ED_FT
YOU WERE SEEN FOR LOW BACK PAIN    YOU HAD LABS DONE    YOU HAVE BEEN DIAGNOSED WITH BACK STRAIN    REST AND PLENTY OF FLUIDS    MAY APPLY WARM PACK TO AREA, 20 MIN ON, 20 MIN OFF    MAY TAKE TYLENOL 650mg EVERY 4 HOURS AS NEEDED FOR MILD PAIN   MAY TAKE MOTRIN 400mg EVERY 6 HOURS AS NEEDED FOR MODERATE PAIN   MAY TAKE MUSCLE RELAXANT EVERY 6 HOURS AS NEEDED FOR SEVERE PAIN.    FOLLOW UP WITH YOUR PRIMARY CARE PROVIDER WITHIN 1 WEEK.    RETURN TO THE EMERGENCY DEPARTMENT FOR WORSENING PAIN, DIFFICULTY WALKING, DIFFICULTY URINATING, OR FOR ANY WORSENING SYMPTOMS.

## 2024-06-01 NOTE — ED ADULT TRIAGE NOTE - CHIEF COMPLAINT QUOTE
pt c/o right sided abdominal/flank area pain worse with inspiration x3 days. no past medical hx. pt denies recent travel, n/v, urinary symptoms. pt well appearing.

## 2024-06-01 NOTE — ED ADULT NURSE NOTE - NSFALLRISK_ED_ALL_ED
- Is hypoglycemic in the ED ISO sepsis and poor PO intake.   - Continue D5 fluids, but despite this is still having hypoglycemic episodes. - hx  - A1C 5.2% last. No

## 2024-06-01 NOTE — ED PROVIDER NOTE - CLINICAL SUMMARY MEDICAL DECISION MAKING FREE TEXT BOX
43F no reported PMH p/w 2-3 of right lower back pain that she awoke with, worsened with laying flat and when trying to change positions while sleeping. No abdominal or CVA tenderness noted on exam. Denies urinary symptoms. LMP beginning of May. Although triage note mentions abdominal pain, pt denies having. Will send UA to assess for blood which could be suggest of renal stone, though exam does not seem c/w renal stones. If present will get CT ab/pel to assess. Check urine pregnancy. No abdominal tenderness noted on exam making acute intra-abd pathology less likely. Anticipate pain likely MSK back pain based off exam. Will treat supportively with tylenol, NSAID, flexeril and lidocaine patch

## 2024-06-01 NOTE — ED PROVIDER NOTE - PROGRESS NOTE DETAILS
Dr. Ponce: UA resulted and discussed results with pt. Discussed possible musculoskeletal and to continue medication previous prescribed. Dr. Ponce: Pt was signed out to me awaiting UA. Pt states feeling better. Denies any dysuria currently.

## 2024-06-01 NOTE — ED ADULT NURSE NOTE - OBJECTIVE STATEMENT
Received pt in intake. Pt is A&Ox4 with no past medical history. pt came to the ED due to having right sided back pain for 2-3 days. Pt reports when she woke up this morning and the pain became worse while lying down. Pt denies any urinary changes or pain. Pt abdomen is soft and nondistended. Pt breathing is equal and nonlabored. Pt denies any chest pain, SOB, headache, nausea, vomiting, diarrhea, fever or chills. Pt safety maintained.

## 2024-06-01 NOTE — ED PROVIDER NOTE - OBJECTIVE STATEMENT
43F no reported PMH p/w 2-3 days of R sided back pain. States she awoke with symptoms and has continued since then. Pain worse when laying down and trying to change positions while sleeping. Denies abdominal pain, n/v/d. No dysuria/hematuria or foul smelling urine. No fever/chills. No falls/trauma. No midline pain or weakness/numbness/loss of sensation. LMP beginning of May.

## 2024-06-02 LAB
CULTURE RESULTS: SIGNIFICANT CHANGE UP
SPECIMEN SOURCE: SIGNIFICANT CHANGE UP
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
